# Patient Record
Sex: FEMALE | Race: WHITE | NOT HISPANIC OR LATINO | Employment: FULL TIME | URBAN - METROPOLITAN AREA
[De-identification: names, ages, dates, MRNs, and addresses within clinical notes are randomized per-mention and may not be internally consistent; named-entity substitution may affect disease eponyms.]

---

## 2022-12-13 ENCOUNTER — OCCUPATIONAL MEDICINE (OUTPATIENT)
Dept: URGENT CARE | Facility: PHYSICIAN GROUP | Age: 57
End: 2022-12-13
Payer: COMMERCIAL

## 2022-12-13 ENCOUNTER — APPOINTMENT (OUTPATIENT)
Dept: RADIOLOGY | Facility: IMAGING CENTER | Age: 57
End: 2022-12-13
Attending: FAMILY MEDICINE
Payer: COMMERCIAL

## 2022-12-13 VITALS
DIASTOLIC BLOOD PRESSURE: 106 MMHG | SYSTOLIC BLOOD PRESSURE: 180 MMHG | RESPIRATION RATE: 16 BRPM | BODY MASS INDEX: 33.66 KG/M2 | WEIGHT: 190 LBS | HEART RATE: 82 BPM | TEMPERATURE: 98 F | HEIGHT: 63 IN | OXYGEN SATURATION: 94 %

## 2022-12-13 DIAGNOSIS — S89.92XA INJURY OF LEFT KNEE, INITIAL ENCOUNTER: ICD-10-CM

## 2022-12-13 DIAGNOSIS — S79.912A INJURY OF LEFT HIP, INITIAL ENCOUNTER: ICD-10-CM

## 2022-12-13 DIAGNOSIS — S70.02XA CONTUSION OF LEFT HIP, INITIAL ENCOUNTER: ICD-10-CM

## 2022-12-13 DIAGNOSIS — S80.02XA CONTUSION OF LEFT KNEE, INITIAL ENCOUNTER: Primary | ICD-10-CM

## 2022-12-13 LAB
BREATH ALCOHOL COMMENT: NORMAL
POC BREATHALIZER: 0 PERCENT (ref 0–0.01)

## 2022-12-13 PROCEDURE — 73502 X-RAY EXAM HIP UNI 2-3 VIEWS: CPT | Mod: TC,LT | Performed by: FAMILY MEDICINE

## 2022-12-13 PROCEDURE — 99203 OFFICE O/P NEW LOW 30 MIN: CPT | Performed by: FAMILY MEDICINE

## 2022-12-13 PROCEDURE — 73564 X-RAY EXAM KNEE 4 OR MORE: CPT | Mod: TC,LT | Performed by: FAMILY MEDICINE

## 2022-12-13 PROCEDURE — 82075 ASSAY OF BREATH ETHANOL: CPT | Performed by: FAMILY MEDICINE

## 2022-12-13 PROCEDURE — 36415 COLL VENOUS BLD VENIPUNCTURE: CPT | Performed by: FAMILY MEDICINE

## 2022-12-13 PROCEDURE — 80307 DRUG TEST PRSMV CHEM ANLYZR: CPT | Performed by: FAMILY MEDICINE

## 2022-12-13 PROCEDURE — 99000 SPECIMEN HANDLING OFFICE-LAB: CPT | Performed by: FAMILY MEDICINE

## 2022-12-13 ASSESSMENT — ENCOUNTER SYMPTOMS
ROS SKIN COMMENTS: NO ABRASION OR LACERATION
BACK PAIN: 0
NECK PAIN: 0

## 2022-12-13 NOTE — PROGRESS NOTES
"Lizzette Negro is a 57 y.o. female who presents with Work-Related Injury (DOI: 12/12/22/Pt was getting out of car and slipped on ice, injured left side of body, knee pain )      DOI: 12/12/2022  Left hip and knee injury. AMBERLY: slipped on ice walking from parking lot into work. Both knee and hip struck the ground.   Left knee pain 7/10. Worse with flexion. Swelling. Prior knee contusion 6 years ago. No surgery. Possible sensation of locking/feels stiff. Left hip pain severity 5/10 severity. History of left hip replacement. Ambulatory with limp. OTC aleve with some improvement. Work duties are primarily at desk.         HPI    Review of Systems   Musculoskeletal:  Negative for back pain and neck pain.   Skin:         No abrasion or laceration              Objective     BP (!) 180/106 (BP Location: Right arm, Patient Position: Sitting, BP Cuff Size: Adult)   Pulse 82   Temp 36.7 °C (98 °F) (Temporal)   Resp 16   Ht 1.6 m (5' 3\")   Wt 86.2 kg (190 lb)   SpO2 94%   BMI 33.66 kg/m²      Physical Exam  Constitutional:       Appearance: Normal appearance.   Skin:     General: Skin is warm and dry.   Neurological:      Mental Status: She is alert.       L hip: Tender lateral aspect without deformity.  No shortening or rotation.  Range of motion is intact.  Distal neurovascular intact.  Skin intact    L knee: Tender lateral aspect of tibial plateau.  No obvious deformity.  No obvious effusion.  Pain is reproduced with flexion at 90 degrees.  Stable.  Distal neurovascular intact.  Skin intact.                   Assessment & Plan      Xray left hip and knee: No fracture or dislocation per radiology    1. Injury of left hip, initial encounter  DX-HIP-COMPLETE - UNILATERAL 2+ LEFT      2. Injury of left knee, initial encounter  DX-KNEE COMPLETE 4+ LEFT      3. Contusion of left hip, initial encounter        4. Contusion of left knee, initial encounter          Differential diagnosis, natural history, " supportive care, and indications for immediate follow-up discussed at length.     Ice, OTC NSAID, rest.  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

## 2022-12-13 NOTE — LETTER
[facility] [place]  [address] - [place] [address]  Phone:  [phone] - Fax:  [phone]   [facility] Progress Report and Disability Certification  Date of Service: 12/13/2022   No Show:  No  Date / Time of Next Visit: 12/16/2022   Claim Information   Patient Name: Pili Negro Claim Number:     Employer:  [employer] Date of Injury: 12/12/2022     Insurer / TPA: [employer] ID / SSN:     Occupation: PURCHASING Diagnosis: Diagnoses of Injury of left hip, initial encounter, Injury of left knee, initial encounter, Contusion of left hip, initial encounter, and Contusion of left knee, initial encounter were pertinent to this visit.    Medical Information   Related to Industrial Injury? Yes   Subjective Complaints:  DOI: 12/12/2022  Left hip and knee injury. AMBERLY: slipped on ice walking from parking lot into work. Both knee and hip struck the ground.   Left knee pain 7/10. Worse with flexion. Swelling. Prior knee contusion 6 years ago. No surgery. Possible sensation of locking/feels stiff. Left hip pain severity 5/10 severity. History of left hip replacement. Ambulatory with limp. OTC aleve with some improvement. Work duties are primarily at desk.       Objective Findings: L hip: Tender lateral aspect without deformity.  No shortening or rotation.  Range of motion is intact.  Distal neurovascular intact.  Skin intact    L knee: Tender lateral aspect of tibial plateau.  No obvious deformity.  No obvious effusion.  Pain is reproduced with flexion at 90 degrees.  Stable.  Distal neurovascular intact.  Skin intact.   Pre-Existing Condition(s):     Assessment:   Initial Visit    Status: Additional Care Required  Permanent Disability:No    Plan:   Comments:rest, ice, otc nsaid as needed    Diagnostics: X-ray  Comments:no fracture    Comments:       Disability Information   Status: Released to Full Duty    From:  12/13/2022  Through:  12/16/2022 Restrictions are: Temporary   Physical Restrictions   Sitting:    Standing:    Stooping:    Bending:      Squatting:    Walking:    Climbing:    Pushing:      Pulling:    Other:    Reaching Above Shoulder (L):   Reaching Above Shoulder (R):       Reaching Below Shoulder (L):    Reaching Below Shoulder (R):      Not to exceed Weight Limits   Carrying(hrs):   Weight Limit(lb):   Lifting(hrs):   Weight  Limit(lb):     Comments:      Repetitive Actions   Hands: i.e. Fine Manipulations from Grasping:     Feet: i.e. Operating Foot Controls:     Driving / Operate Machinery:     Health Care Provider’s Original or Electronic Signature  London Oconnell M.D. Health Care Provider’s Original or Electronic Signature    Miguel Rowan DO MPH     Clinic Name / Location: [address]  [place] [address] Clinic Phone Number: Dept: [phone]   Appointment Time: 12:15 Pm Visit Start Time: 1:39 PM   Check-In Time:  12:15 Pm Visit Discharge Time:  2:50   Original-Treating Physician or Chiropractor    Page 2-Insurer/TPA    Page 3-Employer    Page 4-Employee

## 2022-12-13 NOTE — LETTER
"EMPLOYEE’S CLAIM FOR COMPENSATION/ REPORT OF INITIAL TREATMENT  FORM C-4    EMPLOYEE’S CLAIM - PROVIDE ALL INFORMATION REQUESTED   First Name  Pili Last Name  Marky Birthdate                    1965                Sex  female Claim Number (Insurer’s Use Only)    Home Address  [address] Age  57 y.o. 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 (if applicable)  WALKING IN THE PARKING LOT    How did this injury or occupational disease occur? (Be specific an answer in detail. Use additional sheet if necessary)  WALKED FROM MY VEHICLE ON MY WAY INTO THE OFFICE SLIPPED ON THE ICE AND FELL ON MY KNEE AND HIP   If you believe that you have an occupational disease, when did you first have knowledge of the disability and it relationship to your employment?  N/A Witnesses to the Accident  ON [place]      Nature of Injury or Occupational Disease  Sprain  Part(s) of Body Injured or Affected  Hip (L), Knee (L),     I certify that the above is true and correct to the best of my knowledge and that I have provided this information in order to " obtain the benefits of [place]  I hereby authorize any physician, chiropractor, surgeon, practitioner, or other person, any hospital, including [facility] or [facility] hospital, any medical service organization, any insurance company, or other institution or organization to release to each other, any medical or other information, including benefits paid or payable, pertinent to this injury or disease, except information relative to diagnosis, treatment and/or counseling for AIDS, psychological conditions, alcohol or controlled substances, for which I must give specific authorization.  A Photostat of this authorization shall be as valid as the original.     Date 12/13/2022   [facility] [place] Employee’s Original or  *Electronic Signature   THIS REPORT MUST BE COMPLETED AND MAILED WITHIN 3 WORKING DAYS OF TREATMENT   Place  [facility]  Name of Facility  [facility]   Date  12/13/2022 Diagnosis and Description of Injury or Occupational Disease  (S79.912A) Injury of left hip, initial encounter  (S89.92XA) Injury of left knee, initial encounter  (S70.02XA) Contusion of left hip, initial encounter  (S80.02XA) Contusion of left knee, initial encounter Is there evidence the injured employee was under the influence of alcohol and/or another controlled substance at the time of accident?  ? No ? Yes (if yes, please explain)    Hour  1:39 PM   Diagnoses of Injury of left hip, initial encounter, Injury of left knee, initial encounter, Contusion of left hip, initial encounter, and Contusion of left knee, initial encounter were pertinent to this visit. No   Treatment  Rest, ice, OTC NSAID as needed  Have you advised the patient to remain off work five days or     more?    X-Ray Findings  Negative   ? Yes Indicate dates:   From   To      From information given by the employee, together with  "medical evidence, can        you directly connect this injury or occupational disease as job incurred?  Yes ? No If no, is the injured employee capable of:  ? full duty  Yes ? modified duty      Is additional medical care by a physician indicated?  Yes If Modified Duty, Specify any Limitations / Restrictions      Do you know of any previous injury or disease contributing to this condition or occupational disease?  ? Yes ? No (Explain if yes)                          No   Date  12/13/2022 Print Health Care Provider's   London Oconnell M.D. I certify the employer’s copy of  this form was mailed on:   Address  [address] [place] [address]. [address] Insurer’s Use Only     [facility] Zip  [address]    Provider’s Tax ID Number  [id number] Telephone  Dept: [phone]             Health Care Provider’s Original or Electronic Signature  e-LONDON Rodriguez M.D. Degree (MD,DO, DC,PA-C,APRN)   MD      * Complete and attach Release of Information (Form C-4A) when injured employee signs C-4 Form electronically  ORIGINAL - TREATING HEALTHCARE PROVIDER PAGE 2 - INSURER/TPA PAGE 3 - EMPLOYER PAGE 4 - EMPLOYEE             Form C-4 (rev.08/21)           BRIEF DESCRIPTION OF RIGHTS AND BENEFITS  (Pursuant to [place] 616[place])    Notice of Injury or Occupational Disease (Incident Report Form C-1): If an injury or occupational disease (OD) arises out of and in the course of employment, you must provide written notice to your employer as soon as practicable, but no later than 7 days after the accident or OD. Your employer shall maintain a sufficient supply of the required forms.    Claim for Compensation (Form C-4): If medical treatment is sought, the form C-4 is available at the place of initial treatment. A completed \"Claim for Compensation\" (Form C-4) must be filed within 90 days after an accident or OD. The treating physician or chiropractor must, within 3 working days after treatment, complete and mail to the employer, the employer's " insurer and third-party , the Claim for Compensation.    Medical Treatment: If you require medical treatment for your on-the-job injury or OD, you may be required to select a physician or chiropractor from a list provided by your workers’ compensation insurer, if it has contracted with an Organization for Managed Care (MCO) or Preferred Provider Organization (PPO) or providers of health care. If your employer has not entered into a contract with an MCO or PPO, you may select a physician or chiropractor from the Panel of Physicians and Chiropractors. Any medical costs related to your industrial injury or OD will be paid by your insurer.    Temporary Total Disability (TTD): If your doctor has certified that you are unable to work for a period of at least 5 consecutive days, or 5 cumulative days in a 20-day period, or places restrictions on you that your employer does not accommodate, you may be entitled to TTD compensation.    Temporary Partial Disability (TPD): If the wage you receive upon reemployment is less than the compensation for TTD to which you are entitled, the insurer may be required to pay you TPD compensation to make up the difference. TPD can only be paid for a maximum of 24 months.    Permanent Partial Disability (PPD): When your medical condition is stable and there is an indication of a PPD as a result of your injury or OD, within 30 days, your insurer must arrange for an evaluation by a rating physician or chiropractor to determine the degree of your PPD. The amount of your PPD award depends on the date of injury, the results of the PPD evaluation, your age and wage.    Permanent Total Disability (PTD): If you are medically certified by a treating physician or chiropractor as permanently and totally disabled and have been granted a PTD status by your insurer, you are entitled to receive monthly benefits not to exceed 66 2/3% of your average monthly wage. The amount of your PTD payments  is subject to reduction if you previously received a lump-sum PPD award.    Vocational Rehabilitation Services: You may be eligible for vocational rehabilitation services if you are unable to return to the job due to a permanent physical impairment or permanent restrictions as a result of your injury or occupational disease.    Transportation and Per Maxime Reimbursement: You may be eligible for travel expenses and per maxime associated with medical treatment.    Reopening: You may be able to reopen your claim if your condition worsens after claim closure.     Appeal Process: If you disagree with a written determination issued by the insurer or the insurer does not respond to your request, you may appeal to the Department of Administration, , by following the instructions contained in your determination letter. You must appeal the determination within 70 days from the date of the determination letter at [address], [address], [place], [place] [address], or [address], [address], [place], [place] [address]. If you disagree with the  decision, you may appeal to the Department of Administration, . You must file your appeal within 30 days from the date of the  decision letter at [address], [address], [place], [place] [address], or [address], [address], [place], [place] [address]. If you disagree with a decision of an , you may file a petition for judicial review with the District Court. You must do so within 30 days of the Appeal Officer’s decision. You may be represented by an  at your own expense or you may contact the [facility] for possible representation.    [place]  for Injured Workers (NAIW): If you disagree with a  decision, you may request that NAIW represent you without charge at an  Hearing. For information regarding denial of benefits, you may contact the [facility]  at: [address], [address], [place] [address], [phone], or [address], [address], [place] [address], [phone]    To File a Complaint with the Division: If you wish to file a complaint with the  of the Division of Industrial Relations (DIR),  please contact the Workers’ Compensation Section, [address], [address], [place], [place] [address], telephone [phone]) [phone], or [address], [address], [place], [place] [address], telephone [phone].    For assistance with Workers’ Compensation Issues: You may contact the [facility] Office for Consumer Health Assistance, [address], [address] [facility] [address], [place] [address], Toll Free 1-296.465.1840, Web site: http://[facility].nv.[facility]/Programs/JD E-mail: [email or web address].[facility]              __________________________________________________________________                                    _________________            Employee Name / Signature                                                                                                                            Date                                                                                                                                                                                                                              D-2 (rev. 10/20)